# Patient Record
Sex: MALE | URBAN - METROPOLITAN AREA
[De-identification: names, ages, dates, MRNs, and addresses within clinical notes are randomized per-mention and may not be internally consistent; named-entity substitution may affect disease eponyms.]

---

## 2017-05-13 ENCOUNTER — INPATIENT (INPATIENT)
Facility: HOSPITAL | Age: 47
LOS: 3 days | End: 2017-05-17
Attending: INTERNAL MEDICINE | Admitting: INTERNAL MEDICINE
Payer: SUBSIDIZED

## 2017-05-13 VITALS — RESPIRATION RATE: 18 BRPM | WEIGHT: 190.04 LBS | HEART RATE: 78 BPM | OXYGEN SATURATION: 98 % | HEIGHT: 69 IN

## 2017-05-13 LAB
ALBUMIN SERPL ELPH-MCNC: 3.5 G/DL — SIGNIFICANT CHANGE UP (ref 3.3–5)
ALP SERPL-CCNC: 108 U/L — SIGNIFICANT CHANGE UP (ref 40–120)
ALT FLD-CCNC: 16 U/L — SIGNIFICANT CHANGE UP (ref 12–78)
ANION GAP SERPL CALC-SCNC: 10 MMOL/L — SIGNIFICANT CHANGE UP (ref 5–17)
AST SERPL-CCNC: 27 U/L — SIGNIFICANT CHANGE UP (ref 15–37)
BASOPHILS # BLD AUTO: 0.3 K/UL — HIGH (ref 0–0.2)
BASOPHILS NFR BLD AUTO: 2.3 % — HIGH (ref 0–2)
BILIRUB SERPL-MCNC: 0.3 MG/DL — SIGNIFICANT CHANGE UP (ref 0.2–1.2)
BUN SERPL-MCNC: 34 MG/DL — HIGH (ref 7–23)
CALCIUM SERPL-MCNC: 8.1 MG/DL — LOW (ref 8.5–10.1)
CHLORIDE SERPL-SCNC: 104 MMOL/L — SIGNIFICANT CHANGE UP (ref 96–108)
CK SERPL-CCNC: 212 U/L — SIGNIFICANT CHANGE UP (ref 26–308)
CO2 SERPL-SCNC: 26 MMOL/L — SIGNIFICANT CHANGE UP (ref 22–31)
CREAT SERPL-MCNC: 2.72 MG/DL — HIGH (ref 0.5–1.3)
EOSINOPHIL # BLD AUTO: 0.4 K/UL — SIGNIFICANT CHANGE UP (ref 0–0.5)
EOSINOPHIL NFR BLD AUTO: 3.7 % — SIGNIFICANT CHANGE UP (ref 0–6)
GLUCOSE SERPL-MCNC: 152 MG/DL — HIGH (ref 70–99)
HCT VFR BLD CALC: 41.2 % — SIGNIFICANT CHANGE UP (ref 39–50)
HGB BLD-MCNC: 13.8 G/DL — SIGNIFICANT CHANGE UP (ref 13–17)
LYMPHOCYTES # BLD AUTO: 42.6 % — SIGNIFICANT CHANGE UP (ref 13–44)
LYMPHOCYTES # BLD AUTO: 5 K/UL — HIGH (ref 1–3.3)
MCHC RBC-ENTMCNC: 30.2 PG — SIGNIFICANT CHANGE UP (ref 27–34)
MCHC RBC-ENTMCNC: 33.4 GM/DL — SIGNIFICANT CHANGE UP (ref 32–36)
MCV RBC AUTO: 90.4 FL — SIGNIFICANT CHANGE UP (ref 80–100)
MONOCYTES # BLD AUTO: 0.9 K/UL — SIGNIFICANT CHANGE UP (ref 0–0.9)
MONOCYTES NFR BLD AUTO: 8 % — SIGNIFICANT CHANGE UP (ref 2–14)
NEUTROPHILS # BLD AUTO: 5.1 K/UL — SIGNIFICANT CHANGE UP (ref 1.8–7.4)
NEUTROPHILS NFR BLD AUTO: 43.4 % — SIGNIFICANT CHANGE UP (ref 43–77)
PLATELET # BLD AUTO: 257 K/UL — SIGNIFICANT CHANGE UP (ref 150–400)
POTASSIUM SERPL-MCNC: 2.9 MMOL/L — CRITICAL LOW (ref 3.5–5.3)
POTASSIUM SERPL-SCNC: 2.9 MMOL/L — CRITICAL LOW (ref 3.5–5.3)
PROT SERPL-MCNC: 7.1 GM/DL — SIGNIFICANT CHANGE UP (ref 6–8.3)
RBC # BLD: 4.56 M/UL — SIGNIFICANT CHANGE UP (ref 4.2–5.8)
RBC # FLD: 13.7 % — SIGNIFICANT CHANGE UP (ref 10.3–14.5)
SODIUM SERPL-SCNC: 140 MMOL/L — SIGNIFICANT CHANGE UP (ref 135–145)
TROPONIN I SERPL-MCNC: 0.08 NG/ML — HIGH (ref 0.01–0.04)
WBC # BLD: 11.7 K/UL — HIGH (ref 3.8–10.5)
WBC # FLD AUTO: 11.7 K/UL — HIGH (ref 3.8–10.5)

## 2017-05-13 PROCEDURE — 71010: CPT | Mod: 26

## 2017-05-13 PROCEDURE — 70450 CT HEAD/BRAIN W/O DYE: CPT | Mod: 26

## 2017-05-13 PROCEDURE — 93010 ELECTROCARDIOGRAM REPORT: CPT

## 2017-05-13 RX ORDER — NICARDIPINE HYDROCHLORIDE 30 MG/1
5 CAPSULE, EXTENDED RELEASE ORAL
Qty: 40 | Refills: 0 | Status: DISCONTINUED | OUTPATIENT
Start: 2017-05-13 | End: 2017-05-14

## 2017-05-13 RX ORDER — POTASSIUM CHLORIDE 20 MEQ
10 PACKET (EA) ORAL ONCE
Refills: 0 | Status: DISCONTINUED | OUTPATIENT
Start: 2017-05-13 | End: 2017-05-14

## 2017-05-13 RX ORDER — PROPOFOL 10 MG/ML
90 INJECTION, EMULSION INTRAVENOUS ONCE
Refills: 0 | Status: COMPLETED | OUTPATIENT
Start: 2017-05-13 | End: 2017-05-13

## 2017-05-13 RX ORDER — DEXAMETHASONE 0.5 MG/5ML
10 ELIXIR ORAL ONCE
Refills: 0 | Status: COMPLETED | OUTPATIENT
Start: 2017-05-13 | End: 2017-05-13

## 2017-05-13 RX ORDER — MANNITOL
50 POWDER (GRAM) MISCELLANEOUS ONCE
Refills: 0 | Status: COMPLETED | OUTPATIENT
Start: 2017-05-13 | End: 2017-05-13

## 2017-05-13 RX ORDER — SUCCINYLCHOLINE CHLORIDE 100 MG/5ML
40 SYRINGE (ML) INTRAVENOUS ONCE
Refills: 0 | Status: COMPLETED | OUTPATIENT
Start: 2017-05-13 | End: 2017-05-13

## 2017-05-13 RX ORDER — PROPOFOL 10 MG/ML
20 INJECTION, EMULSION INTRAVENOUS
Qty: 1000 | Refills: 0 | Status: DISCONTINUED | OUTPATIENT
Start: 2017-05-13 | End: 2017-05-14

## 2017-05-13 RX ORDER — SUCCINYLCHOLINE CHLORIDE 100 MG/5ML
90 SYRINGE (ML) INTRAVENOUS ONCE
Refills: 0 | Status: COMPLETED | OUTPATIENT
Start: 2017-05-13 | End: 2017-05-13

## 2017-05-13 RX ADMIN — Medication 250 GRAM(S): at 23:40

## 2017-05-13 RX ADMIN — Medication 90 MILLIGRAM(S): at 22:23

## 2017-05-13 RX ADMIN — PROPOFOL 90 MILLIGRAM(S): 10 INJECTION, EMULSION INTRAVENOUS at 22:22

## 2017-05-13 RX ADMIN — Medication 102 MILLIGRAM(S): at 23:10

## 2017-05-13 RX ADMIN — Medication 40 MILLIGRAM(S): at 22:26

## 2017-05-13 NOTE — ED ADULT NURSE NOTE - OBJECTIVE STATEMENT
Pt presents to the ED unresponsive with sonorous respirations, decorticate posturing noted. EMS states pt complaint was chest pain and abdominal pain. EMS states pt vomitedx1 in route and 4 of zofran was given and 1L bolus infusing. EMS states EKG completed and normal in route. Pt nonresponsive to sternal rub upon assessment and pupils fixed upon assessment. Pt family states that the pt called out for help and when they went down to him he was "wiggling on the floor". Pt family states he did speak after the incident.

## 2017-05-13 NOTE — ED PROVIDER NOTE - CRITICAL CARE PROVIDED
direct patient care (not related to procedure)/additional history taking/interpretation of diagnostic studies/documentation/consultation with other physicians/consult w/ pt's family directly relating to pts condition

## 2017-05-13 NOTE — ED PROVIDER NOTE - PROGRESS NOTE DETAILS
Lee Ann Ramon, DO:  Called into the trauma bay approx 23:00 by Dr. Reddy for intubation assistance as multiple attempts were made without success.  Positioned towel under shoulders and used size 4 cover on glidescope which gave me direct visualization of the cords.  Clear airway without vomitus.  Size 7.5 ETT placed with one attempt to intubate through cords.  Color change and ETCO2 and auscultation confirmed placement of ETT.  Respiratory therapist oxygenated via bag until patient was placed on vent.  Care continued by Dr. Reddy. Due to pt's unresponsiveness and vomiting,  pt to be intubated. One attempt by me and one attempt by respiratory without success. Pt bagged with 100 per cent O2 in between attempts. Dr. Ramon called for further assistance.

## 2017-05-13 NOTE — ED PROVIDER NOTE - MEDICAL DECISION MAKING DETAILS
45 yo hm with hx htn with intraparenchymal bleed and shift.  To be admitted to icu. Family aware of prognosis 45 yo hm with hx htn with intraparenchymal bleed, shift and vomiting.Pt intubated for airway protection and cardene, mannitol given.  Neurosurgery, icu consultation. To be admitted to icu.  No surgical intervention except for bp control due to the extent of bleed and shift. Family aware of poor prognosis.

## 2017-05-13 NOTE — ED PROVIDER NOTE - OBJECTIVE STATEMENT
45 y/o M presents to the ED c/o CP. The pt was brought in for CP associated with NV. Pt became unconscious and was vomiting. Currently further information unavailable. 47 y/o M presents to the ED c/o CP. The pt was brought in for CP associated with NV. Pt became unconscious and was vomiting. Currently further information unavailable.Per family, pt was attending a party then became verbally unresponsive and started to shake. 47 y/o M presents to the ED c/o CP. The pt was brought in for CP associated with NV. Pt became unconscious and was vomiting. Currently further information unavailable. Per family, pt was attending a party then became verbally unresponsive and started to shake. Pmh htn.  Nonsmoker, occ etoh no drugs.

## 2017-05-13 NOTE — ED ADULT NURSE REASSESSMENT NOTE - NS ED NURSE REASSESS COMMENT FT1
2218- Pt vomiting, pt turned on side to prevent aspiration, suction set up and pt being suctioned at this time.   2220- Pt unable to maintain airway, pt nonresponsive with sternal rub and painful stimuli, decorticate posturing noted. MD Reddy at bedside with Respiratory therapists preparing for intubation. Pt on bedside monitor, pt being manually ventilated.   2227- MD Reddy and respiratory attempted to intubate pt without success, MD Ramon at bedside preparing for intubation.  2229- Pt intubated by MD Ramon, positive CO2 color change, breath sounds equal bilaterally. 7.5 tube size, 24 at the lip. Preparing pt to be brought to CT.   Vent settings- A/C 12 VT 550mL Vmax 55 L/min O2 100% PEEP 5.0   2233- Pt brought to CT on monitor with two RN, respiratory therapist, and NA. Pt continues to fight intubation tube, MD awe, propofol drip increased as per MD to 50mcg.   2256- Pt back from CT, CT results phones to MD Reddy, MD has call out to neurosurgery in regards to pt care. OG tube inserted with approx 50cc initial output. Pt remains on bedside monitor.   2300- 16F sandoval inserted as per MD order, initial output of 250cc clear yellow urine noted.   2310- MD Reddy updated pt family as to pt current status and pt current plan of care, pt family in family consult room and verbalized understanding of pt status and pt plan of care.   2340- MD Reddy spoke with neurology consult in regards to pt care, pt started on Manitol drip as per order. Additional IV access initiated.

## 2017-05-14 DIAGNOSIS — I10 ESSENTIAL (PRIMARY) HYPERTENSION: ICD-10-CM

## 2017-05-14 LAB
AMPHET UR-MCNC: NEGATIVE — SIGNIFICANT CHANGE UP
ANION GAP SERPL CALC-SCNC: 11 MMOL/L — SIGNIFICANT CHANGE UP (ref 5–17)
APAP SERPL-MCNC: 3 UG/ML — LOW (ref 10–30)
BARBITURATES UR SCN-MCNC: NEGATIVE — SIGNIFICANT CHANGE UP
BASE EXCESS BLDA CALC-SCNC: -2 MMOL/L — SIGNIFICANT CHANGE UP (ref -2–2)
BENZODIAZ UR-MCNC: NEGATIVE — SIGNIFICANT CHANGE UP
BUN SERPL-MCNC: 34 MG/DL — HIGH (ref 7–23)
CALCIUM SERPL-MCNC: 7.9 MG/DL — LOW (ref 8.5–10.1)
CHLORIDE SERPL-SCNC: 103 MMOL/L — SIGNIFICANT CHANGE UP (ref 96–108)
CO2 SERPL-SCNC: 25 MMOL/L — SIGNIFICANT CHANGE UP (ref 22–31)
COCAINE METAB.OTHER UR-MCNC: NEGATIVE — SIGNIFICANT CHANGE UP
CREAT SERPL-MCNC: 3 MG/DL — HIGH (ref 0.5–1.3)
ETHANOL SERPL-MCNC: <10 MG/DL — SIGNIFICANT CHANGE UP (ref 0–10)
GAS PNL BLDA: SIGNIFICANT CHANGE UP
GLUCOSE SERPL-MCNC: 147 MG/DL — HIGH (ref 70–99)
HCO3 BLDA-SCNC: 20 MMOL/L — LOW (ref 21–29)
HCT VFR BLD CALC: 39 % — SIGNIFICANT CHANGE UP (ref 39–50)
HGB BLD-MCNC: 12.8 G/DL — LOW (ref 13–17)
HOROWITZ INDEX BLDA+IHG-RTO: SIGNIFICANT CHANGE UP
MCHC RBC-ENTMCNC: 30.1 PG — SIGNIFICANT CHANGE UP (ref 27–34)
MCHC RBC-ENTMCNC: 32.9 GM/DL — SIGNIFICANT CHANGE UP (ref 32–36)
MCV RBC AUTO: 91.6 FL — SIGNIFICANT CHANGE UP (ref 80–100)
METHADONE UR-MCNC: NEGATIVE — SIGNIFICANT CHANGE UP
OPIATES UR-MCNC: NEGATIVE — SIGNIFICANT CHANGE UP
PCO2 BLDA: 27 MMHG — LOW (ref 32–46)
PCP SPEC-MCNC: SIGNIFICANT CHANGE UP
PCP UR-MCNC: NEGATIVE — SIGNIFICANT CHANGE UP
PH BLDA: 7.48 — HIGH (ref 7.35–7.45)
PLATELET # BLD AUTO: 211 K/UL — SIGNIFICANT CHANGE UP (ref 150–400)
PO2 BLDA: 238 — SIGNIFICANT CHANGE UP
POTASSIUM SERPL-MCNC: 4.3 MMOL/L — SIGNIFICANT CHANGE UP (ref 3.5–5.3)
POTASSIUM SERPL-SCNC: 4.3 MMOL/L — SIGNIFICANT CHANGE UP (ref 3.5–5.3)
RBC # BLD: 4.26 M/UL — SIGNIFICANT CHANGE UP (ref 4.2–5.8)
RBC # FLD: 14 % — SIGNIFICANT CHANGE UP (ref 10.3–14.5)
SALICYLATES SERPL-MCNC: <1.7 MG/DL — LOW (ref 2.8–20)
SAO2 % BLDA: 100 — SIGNIFICANT CHANGE UP
SODIUM SERPL-SCNC: 139 MMOL/L — SIGNIFICANT CHANGE UP (ref 135–145)
THC UR QL: NEGATIVE — SIGNIFICANT CHANGE UP
WBC # BLD: 12.5 K/UL — HIGH (ref 3.8–10.5)
WBC # FLD AUTO: 12.5 K/UL — HIGH (ref 3.8–10.5)

## 2017-05-14 PROCEDURE — 99291 CRITICAL CARE FIRST HOUR: CPT | Mod: 25

## 2017-05-14 PROCEDURE — 31500 INSERT EMERGENCY AIRWAY: CPT

## 2017-05-14 PROCEDURE — 70450 CT HEAD/BRAIN W/O DYE: CPT | Mod: 26

## 2017-05-14 RX ORDER — PANTOPRAZOLE SODIUM 20 MG/1
40 TABLET, DELAYED RELEASE ORAL DAILY
Refills: 0 | Status: DISCONTINUED | OUTPATIENT
Start: 2017-05-14 | End: 2017-05-15

## 2017-05-14 RX ORDER — CHLORHEXIDINE GLUCONATE 213 G/1000ML
5 SOLUTION TOPICAL
Refills: 0 | Status: DISCONTINUED | OUTPATIENT
Start: 2017-05-14 | End: 2017-05-16

## 2017-05-14 RX ORDER — LEVETIRACETAM 250 MG/1
1000 TABLET, FILM COATED ORAL EVERY 12 HOURS
Refills: 0 | Status: DISCONTINUED | OUTPATIENT
Start: 2017-05-14 | End: 2017-05-14

## 2017-05-14 RX ORDER — ACETAMINOPHEN 500 MG
650 TABLET ORAL EVERY 4 HOURS
Refills: 0 | Status: DISCONTINUED | OUTPATIENT
Start: 2017-05-14 | End: 2017-05-15

## 2017-05-14 RX ORDER — LEVETIRACETAM 250 MG/1
1000 TABLET, FILM COATED ORAL EVERY 12 HOURS
Refills: 0 | Status: DISCONTINUED | OUTPATIENT
Start: 2017-05-14 | End: 2017-05-15

## 2017-05-14 RX ORDER — NICARDIPINE HYDROCHLORIDE 30 MG/1
5 CAPSULE, EXTENDED RELEASE ORAL
Qty: 40 | Refills: 0 | Status: DISCONTINUED | OUTPATIENT
Start: 2017-05-14 | End: 2017-05-14

## 2017-05-14 RX ORDER — SODIUM CHLORIDE 9 MG/ML
1000 INJECTION INTRAMUSCULAR; INTRAVENOUS; SUBCUTANEOUS
Refills: 0 | Status: DISCONTINUED | OUTPATIENT
Start: 2017-05-14 | End: 2017-05-16

## 2017-05-14 RX ORDER — NICARDIPINE HYDROCHLORIDE 30 MG/1
5 CAPSULE, EXTENDED RELEASE ORAL
Qty: 40 | Refills: 0 | Status: DISCONTINUED | OUTPATIENT
Start: 2017-05-14 | End: 2017-05-15

## 2017-05-14 RX ORDER — PROPOFOL 10 MG/ML
25 INJECTION, EMULSION INTRAVENOUS
Qty: 1000 | Refills: 0 | Status: DISCONTINUED | OUTPATIENT
Start: 2017-05-14 | End: 2017-05-15

## 2017-05-14 RX ORDER — PROPOFOL 10 MG/ML
25 INJECTION, EMULSION INTRAVENOUS
Qty: 500 | Refills: 0 | Status: DISCONTINUED | OUTPATIENT
Start: 2017-05-14 | End: 2017-05-14

## 2017-05-14 RX ADMIN — SODIUM CHLORIDE 75 MILLILITER(S): 9 INJECTION INTRAMUSCULAR; INTRAVENOUS; SUBCUTANEOUS at 20:58

## 2017-05-14 RX ADMIN — PROPOFOL 12.93 MICROGRAM(S)/KG/MIN: 10 INJECTION, EMULSION INTRAVENOUS at 06:04

## 2017-05-14 RX ADMIN — LEVETIRACETAM 400 MILLIGRAM(S): 250 TABLET, FILM COATED ORAL at 00:13

## 2017-05-14 RX ADMIN — PANTOPRAZOLE SODIUM 40 MILLIGRAM(S): 20 TABLET, DELAYED RELEASE ORAL at 12:28

## 2017-05-14 RX ADMIN — NICARDIPINE HYDROCHLORIDE 25 MG/HR: 30 CAPSULE, EXTENDED RELEASE ORAL at 09:39

## 2017-05-14 RX ADMIN — CHLORHEXIDINE GLUCONATE 5 MILLILITER(S): 213 SOLUTION TOPICAL at 06:01

## 2017-05-14 RX ADMIN — PROPOFOL 12.93 MICROGRAM(S)/KG/MIN: 10 INJECTION, EMULSION INTRAVENOUS at 15:59

## 2017-05-14 RX ADMIN — LEVETIRACETAM 400 MILLIGRAM(S): 250 TABLET, FILM COATED ORAL at 18:18

## 2017-05-14 RX ADMIN — NICARDIPINE HYDROCHLORIDE 25 MG/HR: 30 CAPSULE, EXTENDED RELEASE ORAL at 22:16

## 2017-05-14 RX ADMIN — NICARDIPINE HYDROCHLORIDE 25 MG/HR: 30 CAPSULE, EXTENDED RELEASE ORAL at 00:05

## 2017-05-14 RX ADMIN — CHLORHEXIDINE GLUCONATE 5 MILLILITER(S): 213 SOLUTION TOPICAL at 18:18

## 2017-05-14 RX ADMIN — PROPOFOL 10.34 MICROGRAM(S)/KG/MIN: 10 INJECTION, EMULSION INTRAVENOUS at 00:05

## 2017-05-14 RX ADMIN — PROPOFOL 12.93 MICROGRAM(S)/KG/MIN: 10 INJECTION, EMULSION INTRAVENOUS at 02:25

## 2017-05-14 RX ADMIN — SODIUM CHLORIDE 75 MILLILITER(S): 9 INJECTION INTRAMUSCULAR; INTRAVENOUS; SUBCUTANEOUS at 07:14

## 2017-05-14 NOTE — ED ADULT NURSE REASSESSMENT NOTE - NS ED NURSE REASSESS COMMENT FT1
Intensivist MD Buckley saw and evaluated pt, MD Argueta from neurosurgery saw and evaluated pt, MDs updated pt family as to current pt status and plan of care. Will continue to monitor.

## 2017-05-14 NOTE — CONSULT NOTE ADULT - ATTENDING COMMENTS
Patient seen and examined upon request, agree with the neurosurgery PA note. Extensive large right sided BG/IVH ICH, hypertensive, h/o hypertension as per family. Poor neurological exam, intubated, fixed anisocoric dilated pupils R>L, no corneal, no gag, spontaneous shivering movements occasionally.   We had an extensive discussion with the family using translation, regarding management and prognosis. Considering neurological finding and imaging, outcome is very poor regardless neurosurgical interventions and the family would like to see how he does in the am for further decision making.   Admit to ICU, Mannitol given, control SBP.   Neurology consult.

## 2017-05-14 NOTE — ED ADULT NURSE REASSESSMENT NOTE - NS ED NURSE REASSESS COMMENT FT1
Pt sandoval removed as per MD Shelton. ABG drawn and sent by respiratory as per MD order, report given to Alpha in ICU. Pt cardene drip lowered to 2.5 as per MD Shelton and parameters. Pt remains on bedside monitor. Awaiting ICU for trnasport, will continue to monitor.

## 2017-05-14 NOTE — H&P ADULT - HISTORY OF PRESENT ILLNESS
46y old M with PMHx of HTN on meds was found unresponsive at a party. BIBA and found to have a BP of 240/140. He was intubated in the ER. On arrival patient is on a a Propofol gtt and a Nicardipine gtt. He is showing extensor posturing. Rt. pupil is minimally responsive.

## 2017-05-14 NOTE — ED ADULT NURSE REASSESSMENT NOTE - NS ED NURSE REASSESS COMMENT FT1
Respiratory therapist changed vent setting from 100% O2 to 50% O2. Awaiting blood gas results and ICU team for transport. Will continue to monitor.

## 2017-05-15 LAB
ALBUMIN SERPL ELPH-MCNC: 2.8 G/DL — LOW (ref 3.3–5)
ALP SERPL-CCNC: 51 U/L — SIGNIFICANT CHANGE UP (ref 40–120)
ALT FLD-CCNC: 12 U/L — SIGNIFICANT CHANGE UP (ref 12–78)
AMYLASE P1 CFR SERPL: 169 U/L — HIGH (ref 25–115)
ANION GAP SERPL CALC-SCNC: 13 MMOL/L — SIGNIFICANT CHANGE UP (ref 5–17)
ANION GAP SERPL CALC-SCNC: 17 MMOL/L — SIGNIFICANT CHANGE UP (ref 5–17)
APTT BLD: 23.1 SEC — LOW (ref 27.5–37.4)
AST SERPL-CCNC: 32 U/L — SIGNIFICANT CHANGE UP (ref 15–37)
BASE EXCESS BLDA CALC-SCNC: -3 MMOL/L — LOW (ref -2–2)
BASE EXCESS BLDA CALC-SCNC: -4 MMOL/L — LOW (ref -2–2)
BASE EXCESS BLDA CALC-SCNC: -5 MMOL/L — LOW (ref -2–2)
BASE EXCESS BLDA CALC-SCNC: -5 MMOL/L — LOW (ref -2–2)
BASE EXCESS BLDA CALC-SCNC: -6 MMOL/L — LOW (ref -2–2)
BASE EXCESS BLDA CALC-SCNC: -6 MMOL/L — LOW (ref -2–2)
BASOPHILS # BLD AUTO: 0 K/UL — SIGNIFICANT CHANGE UP (ref 0–0.2)
BILIRUB SERPL-MCNC: 0.4 MG/DL — SIGNIFICANT CHANGE UP (ref 0.2–1.2)
BLD GP AB SCN SERPL QL: SIGNIFICANT CHANGE UP
BLOOD GAS COMMENTS ARTERIAL: SIGNIFICANT CHANGE UP
BUN SERPL-MCNC: 53 MG/DL — HIGH (ref 7–23)
BUN SERPL-MCNC: 68 MG/DL — HIGH (ref 7–23)
CALCIUM SERPL-MCNC: 7.8 MG/DL — LOW (ref 8.5–10.1)
CALCIUM SERPL-MCNC: 7.9 MG/DL — LOW (ref 8.5–10.1)
CHLORIDE SERPL-SCNC: 106 MMOL/L — SIGNIFICANT CHANGE UP (ref 96–108)
CHLORIDE SERPL-SCNC: 107 MMOL/L — SIGNIFICANT CHANGE UP (ref 96–108)
CO2 SERPL-SCNC: 19 MMOL/L — LOW (ref 22–31)
CO2 SERPL-SCNC: 22 MMOL/L — SIGNIFICANT CHANGE UP (ref 22–31)
CREAT SERPL-MCNC: 5.4 MG/DL — HIGH (ref 0.5–1.3)
CREAT SERPL-MCNC: 5.63 MG/DL — HIGH (ref 0.5–1.3)
EOSINOPHIL # BLD AUTO: 0 K/UL — SIGNIFICANT CHANGE UP (ref 0–0.5)
GAS PNL BLDA: SIGNIFICANT CHANGE UP
GGT SERPL-CCNC: 18 U/L — SIGNIFICANT CHANGE UP (ref 9–50)
GLUCOSE SERPL-MCNC: 219 MG/DL — HIGH (ref 70–99)
GLUCOSE SERPL-MCNC: 257 MG/DL — HIGH (ref 70–99)
HBA1C BLD-MCNC: 5.8 % — HIGH (ref 4–5.6)
HCO3 BLDA-SCNC: 20 MMOL/L — LOW (ref 21–29)
HCO3 BLDA-SCNC: 21 MMOL/L — SIGNIFICANT CHANGE UP (ref 21–29)
HCO3 BLDA-SCNC: 21 MMOL/L — SIGNIFICANT CHANGE UP (ref 21–29)
HCO3 BLDA-SCNC: 22 MMOL/L — SIGNIFICANT CHANGE UP (ref 21–29)
HCO3 BLDA-SCNC: 23 MMOL/L — SIGNIFICANT CHANGE UP (ref 21–29)
HCO3 BLDA-SCNC: 24 MMOL/L — SIGNIFICANT CHANGE UP (ref 21–29)
HCT VFR BLD CALC: 32.8 % — LOW (ref 39–50)
HCT VFR BLD CALC: 40.2 % — SIGNIFICANT CHANGE UP (ref 39–50)
HGB BLD-MCNC: 10.5 G/DL — LOW (ref 13–17)
HGB BLD-MCNC: 12.7 G/DL — LOW (ref 13–17)
HOROWITZ INDEX BLDA+IHG-RTO: 40 — SIGNIFICANT CHANGE UP
INR BLD: 1.01 RATIO — SIGNIFICANT CHANGE UP (ref 0.88–1.16)
LDH SERPL L TO P-CCNC: 245 U/L — HIGH (ref 50–242)
LIDOCAIN IGE QN: 252 U/L — SIGNIFICANT CHANGE UP (ref 73–393)
LYMPHOCYTES # BLD AUTO: 1.4 K/UL — SIGNIFICANT CHANGE UP (ref 1–3.3)
LYMPHOCYTES # BLD AUTO: 6 % — LOW (ref 13–44)
MAGNESIUM SERPL-MCNC: 2.6 MG/DL — SIGNIFICANT CHANGE UP (ref 1.6–2.6)
MAGNESIUM SERPL-MCNC: 2.8 MG/DL — HIGH (ref 1.6–2.6)
MANUAL DIF COMMENT BLD-IMP: SIGNIFICANT CHANGE UP
MCHC RBC-ENTMCNC: 29.6 PG — SIGNIFICANT CHANGE UP (ref 27–34)
MCHC RBC-ENTMCNC: 30.1 PG — SIGNIFICANT CHANGE UP (ref 27–34)
MCHC RBC-ENTMCNC: 31.7 GM/DL — LOW (ref 32–36)
MCHC RBC-ENTMCNC: 32 GM/DL — SIGNIFICANT CHANGE UP (ref 32–36)
MCV RBC AUTO: 92.6 FL — SIGNIFICANT CHANGE UP (ref 80–100)
MCV RBC AUTO: 94.8 FL — SIGNIFICANT CHANGE UP (ref 80–100)
MONOCYTES # BLD AUTO: 1.4 K/UL — HIGH (ref 0–0.9)
MONOCYTES NFR BLD AUTO: 8 % — SIGNIFICANT CHANGE UP (ref 2–14)
NEUTROPHILS # BLD AUTO: 16 K/UL — HIGH (ref 1.8–7.4)
NEUTROPHILS NFR BLD AUTO: 84 % — HIGH (ref 43–77)
NEUTS BAND # BLD: 2 % — SIGNIFICANT CHANGE UP (ref 0–8)
PCO2 BLDA: 38 MMHG — SIGNIFICANT CHANGE UP (ref 32–46)
PCO2 BLDA: 38 MMHG — SIGNIFICANT CHANGE UP (ref 32–46)
PCO2 BLDA: 49 MMHG — HIGH (ref 32–46)
PCO2 BLDA: 50 MMHG — HIGH (ref 32–46)
PCO2 BLDA: 52 MMHG — HIGH (ref 32–46)
PCO2 BLDA: 53 MMHG — HIGH (ref 32–46)
PCO2 BLDA: 55 MMHG — HIGH (ref 32–46)
PCO2 BLDA: 55 MMHG — HIGH (ref 32–46)
PCO2 BLDA: 57 MMHG — HIGH (ref 32–46)
PCO2 BLDA: 62 MMHG — HIGH (ref 32–46)
PH BLDA: 7.21 — LOW (ref 7.35–7.45)
PH BLDA: 7.21 — LOW (ref 7.35–7.45)
PH BLDA: 7.22 — LOW (ref 7.35–7.45)
PH BLDA: 7.24 — LOW (ref 7.35–7.45)
PH BLDA: 7.24 — LOW (ref 7.35–7.45)
PH BLDA: 7.26 — LOW (ref 7.35–7.45)
PH BLDA: 7.26 — LOW (ref 7.35–7.45)
PH BLDA: 7.29 — LOW (ref 7.35–7.45)
PH BLDA: 7.35 — SIGNIFICANT CHANGE UP (ref 7.35–7.45)
PH BLDA: 7.37 — SIGNIFICANT CHANGE UP (ref 7.35–7.45)
PHOSPHATE SERPL-MCNC: 4.8 MG/DL — HIGH (ref 2.5–4.5)
PHOSPHATE SERPL-MCNC: 5 MG/DL — HIGH (ref 2.5–4.5)
PLAT MORPH BLD: NORMAL — SIGNIFICANT CHANGE UP
PLATELET # BLD AUTO: 183 K/UL — SIGNIFICANT CHANGE UP (ref 150–400)
PLATELET # BLD AUTO: 250 K/UL — SIGNIFICANT CHANGE UP (ref 150–400)
PO2 BLDA: 165 — SIGNIFICANT CHANGE UP
PO2 BLDA: 165 — SIGNIFICANT CHANGE UP
PO2 BLDA: 168 — SIGNIFICANT CHANGE UP
PO2 BLDA: 174 — SIGNIFICANT CHANGE UP
PO2 BLDA: 187 — SIGNIFICANT CHANGE UP
PO2 BLDA: 192 — SIGNIFICANT CHANGE UP
PO2 BLDA: 206 — SIGNIFICANT CHANGE UP
PO2 BLDA: 209 — SIGNIFICANT CHANGE UP
PO2 BLDA: 212 — SIGNIFICANT CHANGE UP
PO2 BLDA: 233 — SIGNIFICANT CHANGE UP
POTASSIUM SERPL-MCNC: 4.4 MMOL/L — SIGNIFICANT CHANGE UP (ref 3.5–5.3)
POTASSIUM SERPL-MCNC: 4.4 MMOL/L — SIGNIFICANT CHANGE UP (ref 3.5–5.3)
POTASSIUM SERPL-SCNC: 4.4 MMOL/L — SIGNIFICANT CHANGE UP (ref 3.5–5.3)
POTASSIUM SERPL-SCNC: 4.4 MMOL/L — SIGNIFICANT CHANGE UP (ref 3.5–5.3)
PROT SERPL-MCNC: 5.8 GM/DL — LOW (ref 6–8.3)
PROTHROM AB SERPL-ACNC: 10.9 SEC — SIGNIFICANT CHANGE UP (ref 9.8–12.7)
RBC # BLD: 3.54 M/UL — LOW (ref 4.2–5.8)
RBC # BLD: 4.24 M/UL — SIGNIFICANT CHANGE UP (ref 4.2–5.8)
RBC # FLD: 14.5 % — SIGNIFICANT CHANGE UP (ref 10.3–14.5)
RBC # FLD: 14.8 % — HIGH (ref 10.3–14.5)
RBC BLD AUTO: NORMAL — SIGNIFICANT CHANGE UP
SAO2 % BLDA: 99 — SIGNIFICANT CHANGE UP
SODIUM SERPL-SCNC: 142 MMOL/L — SIGNIFICANT CHANGE UP (ref 135–145)
SODIUM SERPL-SCNC: 142 MMOL/L — SIGNIFICANT CHANGE UP (ref 135–145)
TROPONIN I SERPL-MCNC: 2.43 NG/ML — HIGH (ref 0.01–0.04)
TYPE + AB SCN PNL BLD: SIGNIFICANT CHANGE UP
WBC # BLD: 18.8 K/UL — HIGH (ref 3.8–10.5)
WBC # BLD: 25.8 K/UL — HIGH (ref 3.8–10.5)
WBC # FLD AUTO: 18.8 K/UL — HIGH (ref 3.8–10.5)
WBC # FLD AUTO: 25.8 K/UL — HIGH (ref 3.8–10.5)

## 2017-05-15 PROCEDURE — 71010: CPT | Mod: 26

## 2017-05-15 RX ORDER — PIPERACILLIN AND TAZOBACTAM 4; .5 G/20ML; G/20ML
3.38 INJECTION, POWDER, LYOPHILIZED, FOR SOLUTION INTRAVENOUS EVERY 12 HOURS
Refills: 0 | Status: DISCONTINUED | OUTPATIENT
Start: 2017-05-15 | End: 2017-05-15

## 2017-05-15 RX ORDER — PIPERACILLIN AND TAZOBACTAM 4; .5 G/20ML; G/20ML
2.25 INJECTION, POWDER, LYOPHILIZED, FOR SOLUTION INTRAVENOUS ONCE
Refills: 0 | Status: DISCONTINUED | OUTPATIENT
Start: 2017-05-15 | End: 2017-05-15

## 2017-05-15 RX ORDER — PIPERACILLIN AND TAZOBACTAM 4; .5 G/20ML; G/20ML
2.25 INJECTION, POWDER, LYOPHILIZED, FOR SOLUTION INTRAVENOUS EVERY 6 HOURS
Refills: 0 | Status: DISCONTINUED | OUTPATIENT
Start: 2017-05-15 | End: 2017-05-15

## 2017-05-15 RX ORDER — PHENYLEPHRINE HYDROCHLORIDE 10 MG/ML
0.5 INJECTION INTRAVENOUS
Qty: 80 | Refills: 0 | Status: DISCONTINUED | OUTPATIENT
Start: 2017-05-15 | End: 2017-05-15

## 2017-05-15 RX ORDER — METOPROLOL TARTRATE 50 MG
5 TABLET ORAL ONCE
Refills: 0 | Status: COMPLETED | OUTPATIENT
Start: 2017-05-15 | End: 2017-05-15

## 2017-05-15 RX ADMIN — NICARDIPINE HYDROCHLORIDE 25 MG/HR: 30 CAPSULE, EXTENDED RELEASE ORAL at 09:10

## 2017-05-15 RX ADMIN — SODIUM CHLORIDE 75 MILLILITER(S): 9 INJECTION INTRAMUSCULAR; INTRAVENOUS; SUBCUTANEOUS at 10:04

## 2017-05-15 RX ADMIN — CHLORHEXIDINE GLUCONATE 5 MILLILITER(S): 213 SOLUTION TOPICAL at 05:43

## 2017-05-15 RX ADMIN — LEVETIRACETAM 400 MILLIGRAM(S): 250 TABLET, FILM COATED ORAL at 05:44

## 2017-05-15 RX ADMIN — NICARDIPINE HYDROCHLORIDE 25 MG/HR: 30 CAPSULE, EXTENDED RELEASE ORAL at 05:44

## 2017-05-15 RX ADMIN — PROPOFOL 12.93 MICROGRAM(S)/KG/MIN: 10 INJECTION, EMULSION INTRAVENOUS at 01:57

## 2017-05-15 RX ADMIN — CHLORHEXIDINE GLUCONATE 5 MILLILITER(S): 213 SOLUTION TOPICAL at 17:20

## 2017-05-15 RX ADMIN — PANTOPRAZOLE SODIUM 40 MILLIGRAM(S): 20 TABLET, DELAYED RELEASE ORAL at 14:18

## 2017-05-15 RX ADMIN — PIPERACILLIN AND TAZOBACTAM 25 GRAM(S): 4; .5 INJECTION, POWDER, LYOPHILIZED, FOR SOLUTION INTRAVENOUS at 17:21

## 2017-05-15 RX ADMIN — Medication 650 MILLIGRAM(S): at 01:47

## 2017-05-15 RX ADMIN — NICARDIPINE HYDROCHLORIDE 25 MG/HR: 30 CAPSULE, EXTENDED RELEASE ORAL at 03:30

## 2017-05-15 RX ADMIN — Medication 5 MILLIGRAM(S): at 04:05

## 2017-05-15 RX ADMIN — PHENYLEPHRINE HYDROCHLORIDE 13.26 MICROGRAM(S)/KG/MIN: 10 INJECTION INTRAVENOUS at 11:42

## 2017-05-15 NOTE — DISCHARGE NOTE FOR THE EXPIRED PATIENT - OTHER SIGNIFICANT FINDINGS
Pt brother David Ramachandran and nephew Zion Francisco arrived in Nursing office 17 [id number] concerned as to why pt was removed from the vent. I met with the family and Marissa Coleman per Gely request. I was given permission by the pts brother David to discuss the case openly with the nephew Zion as Zion is adult and fluent in English. I explained the pt had been pronounced brain dead 5/15/17, Zion said he understood brain death, that it had previously been explained by staff. I explained the pt had been kept on life support so family would have time to visit, but by 17 0627 the pt suffered cardiac death and could no longer be maintained in ICU as decomposition would begin. At the onset of cardiac death I attempted to reach David by phone several times but the line did not function, playing the message that the" mailbox was not set up, goodbye",and would disconnect each time. I explained that the pt is now being maintained in the morgue for now. The pt then asked how long could we keep him in the morgue since they had no money for a . Marissa Coleman will discuss the matter with social work and get back to Zion. We expressed our condolences. The family seemed satisfied with this discussion .Haritha Frias RN

## 2017-05-15 NOTE — CDI QUERY NOTE - NSCDIOTHERTXTBX_GEN_ALL_CORE_HH
(1) Clinical documentation indicated , increasing Cret: 2.72, 3.00, 5.40, and decreasing GFR: 27ml, 24ml, 12 ml. Documentation indicates JUNIOR  Please clarify  (a) JUNIOR/ATN      (b) No ATN      (c) Other (Please specify condition)    (2) CT of the Brain indicates " Vasogenic edema" If you agree with CT of brain please document it in the medical record  (a) Vasogenic edema     (b) No vasogenic edema   (c) Other (please specify condition)  Thanks

## 2017-05-15 NOTE — DISCHARGE NOTE FOR THE EXPIRED PATIENT - HOSPITAL COURSE
Patient was adnitted through the ED and was dx with a massive right sided ICH.  neuro surg was called and no intervention was offered due to the extent of the bleed and damage it caused.  On 5/15 the patient herniated and lost all brain stem reflexes and went through brain death criteria and an apnea test and was declared Brain Dead at 16:43.  The patient brother called and is coming in.      Organ donation here and will be approaching the family

## 2017-05-15 NOTE — CONSULT NOTE ADULT - SUBJECTIVE AND OBJECTIVE BOX
Patient is a 46y old  Male who presents with a chief complaint of     HPI:  46y old M with PMHx of HTN on meds was found unresponsive at a party. BIBA and found to have a BP of 240/140. He was intubated in the ER.    Asked to evaluate for coma and possible brain death    PAST MEDICAL & SURGICAL HISTORY:  Hypertension  No significant past surgical history      FAMILY HISTORY:  No pertinent family history in first degree relatives        Social Hx:  Nonsmoker, no drug or alcohol use    MEDICATIONS  (STANDING):  pantoprazole  Injectable 40milliGRAM(s) IV Push daily  chlorhexidine 0.12% Liquid 5milliLiter(s) Swish and Spit two times a day  niCARdipine Infusion 5mG/Hr IV Continuous <Continuous>  sodium chloride 0.9%. 1000milliLiter(s) IV Continuous <Continuous>  phenylephrine    Infusion 0.5MICROgram(s)/kG/Min IV Continuous <Continuous>       Allergies    No Known Allergies    Intolerances        ROS: Pertinent positives in HPI, all other ROS were reviewed and are negative.      Vital Signs Last 24 Hrs  T(C): 37.4, Max: 39.8 (05-15 @ 05:00)  T(F): 99.4, Max: 103.7 (05-15 @ 05:00)  HR: 85 (73 - 121)  BP: 99/48 (82/45 - 166/76)  BP(mean): 61 (51 - 108)  RR: 12 (10 - 23)  SpO2: 100% (99% - 100%)        Constitutional: coma  HEENT: PERRLA, EOMI,   Neck: Supple.  Respiratory: Breath sounds are clear bilaterally  Cardiovascular: S1 and S2, regular / irregular rhythm  Gastrointestinal: soft, nontender  Extremities:  no edema  Vascular: Carotid Bruit - no  Musculoskeletal: no joint swelling/tenderness, no abnormal movements  Skin: No rashes    Neurological exam:  coma  CN: pupils fixed, mid position.  no corneal reflex, no oculocephalic reflex, no response to cold caloric testing  no posturing, no movement to pain  toes mute  DTR's trace            Labs:                        12.7   25.8  )-----------( 250      ( 15 May 2017 05:35 )             40.2     05-15    142  |  106  |  53<H>  ----------------------------<  257<H>  4.4   |  19<L>  |  5.40<H>    Ca    7.9<L>      15 May 2017 05:35  Phos  5.0     05-15  Mg     2.6     05-15    TPro  7.1  /  Alb  3.5  /  TBili  0.3  /  DBili  x   /  AST  27  /  ALT  16  /  AlkPhos  108  05-13            Radiology report:  - CT head:Unchanged parenchymal hemorrhage centered around the right basal ganglia   withprobable hemorrhage in the midbrain and mark. New lucency in the   parasagittal right frontal lobe (series 2, image 22), suspicious for   developing right BENJIE infarct. Unchanged intraventricular extension with   slightly in hydrocephalus and left lateral ventricular entrapment.   Unchanged mass effect on the right frontal horn and leftward midline   shift of approximately 0.5 cm. Unchanged effacement of the   perimesencephalic and suprasellar cisterns.    IMPRESSION:    1.  Since May 13, 2017, unchanged right basal ganglia parenchymal   hemorrhage with extension into the mark and midbrain.   2.  Slightly increased hydrocephalus and left ventricular entrapment.  3.  New lucency in the parasagittal right frontal lobe, consistent with   developing right BENJIE infarct.      A/P:    Intracerebral hemorrhage, likely hypertensive.      Neuro examination currently consistent with clinical brain death.  Apnea test to be performed.
Patient is a 46y old  Male who presents with a chief complaint of loss of consciousness/seizure brought by EMS to ED while at a family party    HPI:  Known history of hypertension.  As per brother no history of drug use/abuse.  Became unresponsive.  Currently intubated currently on propofol.    history given by patients brother    PAST MEDICAL & SURGICAL HISTORY:  Hypertension      SOCIAL HISTORY: + occasional EtOH,  - tobacco,  - drugs    FAM[place] HISTORY: ? [place]    Mode: AC/ CMV (Assist Control/ Continuous Mandatory Ventilation)  RR (machine): 12  TV (machine): 550  PEEP: 5  ITime: 1  PIP: 2        ROS:   unobtainable      MEDICATIONS  (STANDING):  niCARdipine Infusion 5mG/Hr IV Continuous <Continuous>  propofol Infusion 20MICROgram(s)/kG/Min IV Continuous <Continuous>  potassium chloride  10 mEq/100 mL IVPB 10milliEquivalent(s) IV Intermittent once  levETIRAcetam  IVPB 1000milliGRAM(s) IV Intermittent every 12 hours    MEDICATIONS  (PRN):      Allergies    No Known Allergies    Intolerances        Vital Signs Last 24 Hrs  T(C): --  T(F): --  HR: 78 (78 - 78)  BP: --  BP(mean): --  RR: 18 (18 - 18)  SpO2: 98% (98% - 98%)    PHYSICAL EXAM:  GENERAL: NAD, well-groomed, well-developed  HEAD:  Atraumatic, Normocephalic  EYES: pupils midposition and fixed without corneals or dolls eyes  ENMT: No tonsillar erythema, exudates, or enlargement; Moist mucous membranes, Good dentition, No lesions  NECK: Supple, No JVD, Normal thyroid  NERVOUS SYSTEM: postures to stim in all extremities - decorticate  CHEST/LUNG: Clear to percussion bilaterally; No rales, rhonchi, wheezing, or rubs - on vent  HEART: Regular rate and rhythm; No murmurs, rubs, or gallops  ABDOMEN: Soft, Nontender, Nondistended; Bowel sounds present  EXTREMITIES:  2+ Peripheral Pulses, No clubbing, cyanosis, or edema  LYMPH: No lymphadenopathy noted  SKIN: No rashes or lesions                            13.8   11.7  )-----------( 257      ( 13 May 2017 22:23 )             41.2     05-13    140  |  104  |  34<H>  ----------------------------<  152<H>  2.9<LL>   |  26  |  2.72<H>    Ca    8.1<L>      13 May 2017 22:23    TPro  7.1  /  Alb  3.5  /  TBili  0.3  /  DBili  x   /  AST  27  /  ALT  16  /  AlkPhos  108  05-13        LIVER FUNCTIONS - ( 13 May 2017 22:23 )  Alb: 3.5 g/dL / Pro: 7.1 gm/dL / ALK PHOS: 108 U/L / ALT: 16 U/L / AST: 27 U/L / GGT: x                   RADIOLOGY & ADDITIONAL STUDIES:    PROCEDURE DATE:  05/13/2017        INTERPRETATION:  Brain CT    Indication: Unresponsive, hypertensive    Technique: Axial images without contrast.  Reformatted coronal and   sagittal images are submitted.    COMPARISON:  CT of April 22, 2016    FINDINGS:    There is a 6 x 4.6 x 4.6 cm right basal ganglia hemorrhage with   surrounding vasogenic edema. There is mild intraventricular extension of   hemorrhage into the left frontal horn, third and fourth ventricle. There   is mass effect on the right frontal horn and mild right to left   subfalcine shift measuring 6 mm. There is effacement of the   perimesencephalic cisterns. There is mild ventriculomegaly.    The mastoid air cells and visualized paranasal sinuses are well aerated.    The visualized osseous structures are unremarkable.    IMPRESSION:    Large right basal ganglia hemorrhage with intraventricular extension.   There is surrounding mass effect with resultant 6 mm right to left   subfalcine shift and effacement of the perimesencephalic cisterns. Mild   ventriculomegaly. The findings were discussed with Dr. Reddy at 11:05 PM   on May 14 2017 with RBV.        Height (cm): 175.3 (05-13 @ 22:01)  Weight (kg): 86.2 (05-13 @ 22:01)  BMI (kg/m2): 28.1 (05-13 @ 22:01)  BSA (m2): 2.02 (05-13 @ 22:01)      IMP: Serious R BG hemorrhage and IVH with blood and ischemia to midbrain/mark        PLAN: Seen and evaluated with Dr. Argueta  No neurosurgical intervention indicated as there is no hope of meaningful recovery.  Discussed in detail with patients brother and his family by Dr. Argueta  Poor prognosis

## 2017-05-15 NOTE — PROGRESS NOTE ADULT - ATTENDING COMMENTS
Patient with poor neurological exam and out-come, case has been discussed with family/ICU staff. Patient for comfort care.

## 2017-05-15 NOTE — PROCEDURE NOTE - NSPROCDETAILS_GEN_ALL_CORE
location identified, draped/prepped, sterile technique used, needle inserted/introduced/positive blood return obtained via catheter/connected to a pressurized flush line/sutured in place/Seldinger technique

## 2017-05-16 RX ADMIN — CHLORHEXIDINE GLUCONATE 5 MILLILITER(S): 213 SOLUTION TOPICAL at 05:59

## 2017-05-16 NOTE — PROGRESS NOTE ADULT - SUBJECTIVE AND OBJECTIVE BOX
Called to see the patient earlier.  He had stoped breathing above the vent.  At that time he had lost all brain stem reflexes as well.  Neuro called and did the brain death exam.  then the apnea test was done and the CO2 idania greater than 60.      PAST MEDICAL & SURGICAL HISTORY:  Hypertension  No significant past surgical history      FAMILY HISTORY:  No pertinent family history in first degree relatives      Social Hx:    Allergies    No Known Allergies    Intolerances          Weight (kg): 70.7 (05-15 @ 11:27)    ICU Vital Signs Last 24 Hrs  T(C): 36.7, Max: 39.8 (05-15 @ 05:00)  T(F): 98, Max: 103.7 (05-15 @ 05:00)  HR: 99 (83 - 121)  BP: 121/64 (82/45 - 166/76)  BP(mean): 78 (51 - 98)  ABP: 134/78 (122/72 - 146/81)  ABP(mean): 94 (87 - 100)  RR: 12 (10 - 20)  SpO2: 100% (99% - 100%)      Mode: AC/ CMV (Assist Control/ Continuous Mandatory Ventilation)  RR (machine): 12  TV (machine): 550  FiO2: 40  PEEP: 5  ITime: 1  PIP: 24      I&O's Summary  I & Os for 24h ending 15 May 2017 07:00  =============================================  IN: 3027.9 ml / OUT: 1025 ml / NET: 2002.9 ml    I & Os for current day (as of 15 May 2017 18:08)  =============================================  IN: 905.7 ml / OUT: 1080 ml / NET: -174.3 ml                            10.5   18.8  )-----------( 183      ( 15 May 2017 12:16 )             32.8       05-15    142  |  107  |  68<H>  ----------------------------<  219<H>  4.4   |  22  |  5.63<H>    Ca    7.8<L>      15 May 2017 12:16  Phos  4.8     05-15  Mg     2.8     05-15    TPro  5.8<L>  /  Alb  2.8<L>  /  TBili  0.4  /  DBili  x   /  AST  32  /  ALT  12  /  AlkPhos  51  05-15      CARDIAC MARKERS ( 15 May 2017 12:16 )  2.430 ng/mL / x     / x     / x     / x      CARDIAC MARKERS ( 13 May 2017 22:23 )  0.080 ng/mL / x     / 212 U/L / x     / x            ABG - ( 15 May 2017 16:43 )  pH: 7.21  /  pCO2: 62    /  pO2: 233   / HCO3: 24    / Base Excess: -4    /  SaO2: 99                      MEDICATIONS  (STANDING):  pantoprazole  Injectable 40milliGRAM(s) IV Push daily  chlorhexidine 0.12% Liquid 5milliLiter(s) Swish and Spit two times a day  niCARdipine Infusion 5mG/Hr IV Continuous <Continuous>  sodium chloride 0.9%. 1000milliLiter(s) IV Continuous <Continuous>  phenylephrine    Infusion 0.5MICROgram(s)/kG/Min IV Continuous <Continuous>  piperacillin/tazobactam IVPB. 3.375Gram(s) IV Intermittent every 12 hours    MEDICATIONS  (PRN):  acetaminophen  Suppository 650milliGRAM(s) Rectal every 4 hours PRN For Temp greater than 38.5 C (101.3 F)    A/P; 46 male with a massive R ICH, JUNIOR, and now is brain dead    Plan:  Patients brother called and coming in  Organ donation will address the family  Time of death was 16;43      DVT Prophylaxis:    Advanced Directives:  Discussed with:    Visit Information:    ** Time is exclusive of billed procedures and/or teaching and/or routine family updates.
46 male who collapsed at a party.  Found to have a  large R ICH and intubated in the ED for respiratory failure.  Seen by NS and nothing to due because of the extent of the ICH.      5/15: He remains in a COMA on the Vent.  Temp over night  5/16: Patient now Brain dead and declared yesterday           PAST MEDICAL & SURGICAL HISTORY:  Hypertension  No significant past surgical history      FAMILY HISTORY:  No pertinent family history in first degree relatives      Social Hx:    Allergies    No Known Allergies    Intolerances          Weight (kg): 70.7 (05-15 @ 11:27)    ICU Vital Signs Last 24 Hrs  T(C): 36.2, Max: 37.5 (05-15 @ 11:00)  T(F): 97.1, Max: 99.5 (05-15 @ 11:00)  HR: 73 (73 - 120)  BP: 139/93 (82/45 - 139/93)  BP(mean): 103 (51 - 103)  ABP: 166/98 (122/72 - 168/99)  ABP(mean): 122 (87 - 124)  RR: 12 (12 - 14)  SpO2: 100% (100% - 100%)      Mode: AC/ CMV (Assist Control/ Continuous Mandatory Ventilation)  RR (machine): 12  TV (machine): 0.55  FiO2: 40  PEEP: 5  ITime: 1  PIP: 21      I&O's Summary    I & Os for current day (as of 16 May 2017 09:00)  =============================================  IN: 1505.7 ml / OUT: 4560 ml / NET: -3054.3 ml                            10.5   18.8  )-----------( 183      ( 15 May 2017 12:16 )             32.8       05-15    142  |  107  |  68<H>  ----------------------------<  219<H>  4.4   |  22  |  5.63<H>    Ca    7.8<L>      15 May 2017 12:16  Phos  4.8     05-15  Mg     2.8     05-15    TPro  5.8<L>  /  Alb  2.8<L>  /  TBili  0.4  /  DBili  x   /  AST  32  /  ALT  12  /  AlkPhos  51  05-15      CARDIAC MARKERS ( 15 May 2017 12:16 )  2.430 ng/mL / x     / x     / x     / x            ABG - ( 15 May 2017 16:43 )  pH: 7.21  /  pCO2: 62    /  pO2: 233   / HCO3: 24    / Base Excess: -4    /  SaO2: 99                      MEDICATIONS  (STANDING):  chlorhexidine 0.12% Liquid 5milliLiter(s) Swish and Spit two times a day  sodium chloride 0.9%. 1000milliLiter(s) IV Continuous <Continuous>    MEDICATIONS  (PRN):      DVT Prophylaxis:    Advanced Directives:  Discussed with:    Visit Information:    ** Time is exclusive of billed procedures and/or teaching and/or routine family updates.
Patient is a 46y old  Male who presents with a chief complaint of loss of consciousness/seizure brought by EMS to ED while at a family party.    5/15: Pt in coma, no corneals, no dolls eye, no Gag or cough reflex. Hypertensive yesterday managed by ICU team [facility] donation team here yesterday for evaluation. Dr. Sneed      Vital Signs Last 24 Hrs  T(C): --  T(F): --  HR: 78 (78 - 78)  BP: --  BP(mean): --  RR: 18 (18 - 18)  SpO2: 98% (98% - 98%)    PHYSICAL EXAM:  GENERAL: NAD, well-groomed, well-developed  HEAD:  Atraumatic, Normocephalic  EYES: pupils midposition and fixed without corneals or dolls eyes  ENMT: No tonsillar erythema, exudates, or enlargement; Moist mucous membranes, Good dentition, No lesions  NECK: Supple, No JVD, Normal thyroid  NERVOUS SYSTEM: postures to stim in all extremities - decorticate  CHEST/LUNG: Clear to percussion bilaterally; No rales, rhonchi, wheezing, or rubs - on vent  HEART: Regular rate and rhythm; No murmurs, rubs, or gallops  ABDOMEN: Soft, Nontender, Nondistended; Bowel sounds present  EXTREMITIES:  2+ Peripheral Pulses, No clubbing, cyanosis, or edema  LYMPH: No lymphadenopathy noted  SKIN: No rashes or lesions                            13.8   11.7  )-----------( 257      ( 13 May 2017 22:23 )             41.2     05-13    140  |  104  |  34<H>  ----------------------------<  152<H>  2.9<LL>   |  26  |  2.72<H>    Ca    8.1<L>      13 May 2017 22:23    TPro  7.1  /  Alb  3.5  /  TBili  0.3  /  DBili  x   /  AST  27  /  ALT  16  /  AlkPhos  108  05-13        LIVER FUNCTIONS - ( 13 May 2017 22:23 )  Alb: 3.5 g/dL / Pro: 7.1 gm/dL / ALK PHOS: 108 U/L / ALT: 16 U/L / AST: 27 U/L / GGT: x                   RADIOLOGY & ADDITIONAL STUDIES:    PROCEDURE DATE:  05/13/2017        INTERPRETATION:  Brain CT    Indication: Unresponsive, hypertensive    Technique: Axial images without contrast.  Reformatted coronal and   sagittal images are submitted.    COMPARISON:  CT of April 22, 2016    FINDINGS:    There is a 6 x 4.6 x 4.6 cm right basal ganglia hemorrhage with   surrounding vasogenic edema. There is mild intraventricular extension of   hemorrhage into the left frontal horn, third and fourth ventricle. There   is mass effect on the right frontal horn and mild right to left   subfalcine shift measuring 6 mm. There is effacement of the   perimesencephalic cisterns. There is mild ventriculomegaly.    The mastoid air cells and visualized paranasal sinuses are well aerated.    The visualized osseous structures are unremarkable.    IMPRESSION:    Large right basal ganglia hemorrhage with intraventricular extension.   There is surrounding mass effect with resultant 6 mm right to left   subfalcine shift and effacement of the perimesencephalic cisterns. Mild   ventriculomegaly. The findings were discussed with Dr. Reddy at 11:05 PM   on May 14 2017 with [facility].        Height (cm): 175.3 (05-13 @ 22:01)  Weight (kg): 86.2 (05-13 @ 22:01)  BMI (kg/m2): 28.1 (05-13 @ 22:01)  BSA (m2): 2.02 (05-13 @ 22:01)      IMP: Serious R BG hemorrhage and IVH with blood and ischemia to midbrain/mark        PLAN: Seen and evaluated with Dr. Argueta  No neurosurgical intervention indicated as there is no hope of meaningful recovery.  Discussed in detail with patients brother and his family by Dr. Argueta  Poor prognosis
Patient seen and examined and case discussed with the neurosurgery PA.   Neurologically grossly unchanged, pupils small and non-reactive, jerking/ decerebrate posture on the extremities no gag, no dolls, no corneal.  F/U SBP closely, F/U exam  F/U CTH this am  Awaiting family for further care, at the present time they did not want any intervention considering poor prognosis.
Patient seen in bed and he remains unresponsive in a COMA.         PAST MEDICAL & SURGICAL HISTORY:  Hypertension  No significant past surgical history      FAMILY HISTORY:  No pertinent family history in first degree relatives      Social Hx:    Allergies    No Known Allergies    Intolerances        Height (cm): 175.3 (05-13 @ 22:01)    ICU Vital Signs Last 24 Hrs  T(C): 37.1, Max: 37.1 (05-14 @ 06:00)  T(F): 98.8, Max: 98.8 (05-14 @ 06:00)  HR: 82 (75 - 115)  BP: 163/104 (126/64 - 248/148)  BP(mean): 117 (92 - 118)  ABP: --  ABP(mean): --  RR: 18 (12 - 26)  SpO2: 100% (87% - 100%)      Mode: AC/ CMV (Assist Control/ Continuous Mandatory Ventilation)  RR (machine): 12  TV (machine): 550  FiO2: 40  PEEP: 5  ITime: 1  PIP: 18      I&O's Summary  I & Os for 24h ending 14 May 2017 07:00  =============================================  IN: 336.1 ml / OUT: 1000 ml / NET: -663.9 ml    I & Os for current day (as of 14 May 2017 13:31)  =============================================  IN: 150 ml / OUT: 0 ml / NET: 150 ml      Neuro: No Corneal, pupils, no gag, dolld  + Cough  breaths above vent                            12.8   12.5  )-----------( 211      ( 14 May 2017 05:41 )             39.0       05-14    139  |  103  |  34<H>  ----------------------------<  147<H>  4.3   |  25  |  3.00<H>    Ca    7.9<L>      14 May 2017 05:41    TPro  7.1  /  Alb  3.5  /  TBili  0.3  /  DBili  x   /  AST  27  /  ALT  16  /  AlkPhos  108  05-13      CARDIAC MARKERS ( 13 May 2017 22:23 )  0.080 ng/mL / x     / 212 U/L / x     / x            ABG - ( 14 May 2017 01:20 )  pH: 7.48  /  pCO2: 27    /  pO2: 238   / HCO3: 20    / Base Excess: -2    /  SaO2: 100                     MEDICATIONS  (STANDING):  pantoprazole  Injectable 40milliGRAM(s) IV Push daily  levETIRAcetam  IVPB 1000milliGRAM(s) IV Intermittent every 12 hours  chlorhexidine 0.12% Liquid 5milliLiter(s) Swish and Spit two times a day  niCARdipine Infusion 5mG/Hr IV Continuous <Continuous>  propofol Infusion 25MICROgram(s)/kG/Min IV Continuous <Continuous>  sodium chloride 0.9%. 1000milliLiter(s) IV Continuous <Continuous>      A/P: 46 male with ICH in a COMA and JUNIOR of unknown etiology    Plan:  ICU  No weaning  Keep SBP < 150  PPI  Spoke with the patients brother via  phone to discuses the HCT from this AM, that he is in a COMA, and he will likely never regain any meaningful neurological recovery  Venjennifer  D/W YONI Argueta the findings on the CTH of hydro--No intervention to the extensive damage from the ICH            DVT Prophylaxis:    Advanced Directives:  Discussed with:    Visit Information: add 45 min    ** Time is exclusive of billed procedures and/or teaching and/or routine family updates.
46 male who collapsed at a party.  Found to have a  large R ICH and intubated in the ED for respiratory failure.  Seen by NS and nothing to due because of the extent of the ICH.      5/15: He remains in a COMA on the Vent.  Temp over night         PAST MEDICAL & SURGICAL HISTORY:  Hypertension  No significant past surgical history      FAMILY HISTORY:  No pertinent family history in first degree relatives      Social Hx:    Allergies    No Known Allergies    Intolerances            ICU Vital Signs Last 24 Hrs  T(C): 37.1, Max: 39.8 (05-15 @ 05:00)  T(F): 98.7, Max: 103.7 (05-15 @ 05:00)  HR: 103 (73 - 121)  BP: 132/59 (121/52 - 166/76)  BP(mean): 77 (69 - 108)  ABP: --  ABP(mean): --  RR: 11 (10 - 23)  SpO2: 100% (99% - 100%)      Mode: AC/ CMV (Assist Control/ Continuous Mandatory Ventilation)  RR (machine): 12  TV (machine): 0.55  FiO2: 40  PEEP: 5  ITime: 1  PIP: 26      I&O's Summary  I & Os for 24h ending 15 May 2017 07:00  =============================================  IN: 3027.9 ml / OUT: 1025 ml / NET: 2002.9 ml    I & Os for current day (as of 15 May 2017 10:11)  =============================================  IN: 250 ml / OUT: 0 ml / NET: 250 ml                            12.7   25.8  )-----------( 250      ( 15 May 2017 05:35 )             40.2       05-15    142  |  106  |  53<H>  ----------------------------<  257<H>  4.4   |  19<L>  |  5.40<H>    Ca    7.9<L>      15 May 2017 05:35  Phos  5.0     05-15  Mg     2.6     05-15    TPro  7.1  /  Alb  3.5  /  TBili  0.3  /  DBili  x   /  AST  27  /  ALT  16  /  AlkPhos  108  05-13      CARDIAC MARKERS ( 13 May 2017 22:23 )  0.080 ng/mL / x     / 212 U/L / x     / x            ABG - ( 14 May 2017 01:20 )  pH: 7.48  /  pCO2: 27    /  pO2: 238   / HCO3: 20    / Base Excess: -2    /  SaO2: 100                     MEDICATIONS  (STANDING):  pantoprazole  Injectable 40milliGRAM(s) IV Push daily  chlorhexidine 0.12% Liquid 5milliLiter(s) Swish and Spit two times a day  niCARdipine Infusion 5mG/Hr IV Continuous <Continuous>  sodium chloride 0.9%. 1000milliLiter(s) IV Continuous <Continuous>    MEDICATIONS  (PRN):  acetaminophen  Suppository 650milliGRAM(s) Rectal every 4 hours PRN For Temp greater than 38.5 C (101.3 F)      DVT Prophylaxis: V    Advanced Directives:  Discussed with:    Visit Information: 45 min    ** Time is exclusive of billed procedures and/or teaching and/or routine family updates.

## 2017-05-16 NOTE — PROGRESS NOTE ADULT - ASSESSMENT
A/P 46 male who presented with a massive R ICH with IV extension, JUNIOR, and Acute respiratory failure    Plan:  ICU    PULM: No weaning, Acute Respiratory failure from Massive ICH    Cardio: Continue cardene to keep SBP < 150    Renal: Patient was in JUNIOR most likely ATN resulting from the  hypertensive emergency    GI: Not tolerating feeds.  Will hold, PPI    Neuro: Massive R BG Bleed with vasogenic cerebral edema from the ICH.  He likely herniated thus resulting in Brain Death      Family to decide when to remove the ET tube.
45Y/O Male S/P large BG hemorrhage.   With poor neurological exam no Neurosurgical intervention possible.   call us back if needed.   Care as per ICU Attending.
A/P 46 male who presented with a massive R ICH with IV extension, JUNIOR, and Acute respiratory failure    Plan:  ICU    PULM: No weaning    Cardio: Continue cardene to keep SBP < 150    Renal: Patient in JUNIOR most likely from hypertensive emergency    GI: Not tolerating feeds.  Will hold, PPI    Neuro: Massive R BG Bleed with vasogenic cerebral edema from the ICH.  At this time he is likely herniating as his pupils are now blown.  He does not meet brain death criteria ss he is breathing above the vent.  He does not need an EEG.      Will call palliative care

## 2017-05-17 VITALS — HEART RATE: 50 BPM | RESPIRATION RATE: 12 BRPM

## 2017-05-20 LAB
CULTURE RESULTS: SIGNIFICANT CHANGE UP
CULTURE RESULTS: SIGNIFICANT CHANGE UP
SPECIMEN SOURCE: SIGNIFICANT CHANGE UP
SPECIMEN SOURCE: SIGNIFICANT CHANGE UP

## 2017-05-22 DIAGNOSIS — R40.20 UNSPECIFIED COMA: ICD-10-CM

## 2017-05-22 DIAGNOSIS — I61.6 NONTRAUMATIC INTRACEREBRAL HEMORRHAGE, MULTIPLE LOCALIZED: ICD-10-CM

## 2017-05-22 DIAGNOSIS — I61.5 NONTRAUMATIC INTRACEREBRAL HEMORRHAGE, INTRAVENTRICULAR: ICD-10-CM

## 2017-05-22 DIAGNOSIS — N17.0 ACUTE KIDNEY FAILURE WITH TUBULAR NECROSIS: ICD-10-CM

## 2017-05-22 DIAGNOSIS — G93.6 CEREBRAL EDEMA: ICD-10-CM

## 2017-05-22 DIAGNOSIS — I16.1 HYPERTENSIVE EMERGENCY: ICD-10-CM

## 2017-05-22 DIAGNOSIS — J96.00 ACUTE RESPIRATORY FAILURE, UNSPECIFIED WHETHER WITH HYPOXIA OR HYPERCAPNIA: ICD-10-CM

## 2017-05-22 DIAGNOSIS — R56.9 UNSPECIFIED CONVULSIONS: ICD-10-CM

## 2017-05-22 DIAGNOSIS — I62.9 NONTRAUMATIC INTRACRANIAL HEMORRHAGE, UNSPECIFIED: ICD-10-CM

## 2018-03-29 NOTE — PATIENT PROFILE ADULT. - AS [facility]
Addended by: MARY DAVIDSON on: 6/22/2017 03:53 PM     Modules accepted: Level of Service    
No significant past surgical history
(2) very limited

## 2018-11-26 NOTE — DIETITIAN INITIAL EVALUATION ADULT. - OTHER INFO
26-Nov-2018 11:37 Consult for Chewing and swallowing difficulty. Pt unable to provide h/x, family not in room. Per MD's notes pt prognosis guarded/poor. Pt's family holding intervention at this time due to pt's condition.

## 2021-11-06 NOTE — ED ADULT NURSE NOTE - CAS DISCH BELONGINGS RETURNED
Yes Jaya Cox)  Orthopaedic Surgery  [address] [place] [address], [address]  [place] [address]  Phone: [phone]  Fax: [phone]  Follow Up Time:

## 2024-05-29 NOTE — ED ADULT TRIAGE NOTE - HEART RATE (BEATS/MIN)
Spoke to [facility] a shipment of Novolog was sent to patient on April 17 2024 and they confirmed pt received medication 4/23/24.  Not due for refills until July 2024   78
